# Patient Record
Sex: FEMALE | Race: WHITE | NOT HISPANIC OR LATINO
[De-identification: names, ages, dates, MRNs, and addresses within clinical notes are randomized per-mention and may not be internally consistent; named-entity substitution may affect disease eponyms.]

---

## 2020-06-03 ENCOUNTER — ASOB RESULT (OUTPATIENT)
Age: 35
End: 2020-06-03

## 2020-06-03 ENCOUNTER — APPOINTMENT (OUTPATIENT)
Dept: ANTEPARTUM | Facility: CLINIC | Age: 35
End: 2020-06-03
Payer: COMMERCIAL

## 2020-06-03 PROBLEM — Z00.00 ENCOUNTER FOR PREVENTIVE HEALTH EXAMINATION: Status: ACTIVE | Noted: 2020-06-03

## 2020-06-03 PROCEDURE — 76801 OB US < 14 WKS SINGLE FETUS: CPT

## 2020-06-03 PROCEDURE — 76813 OB US NUCHAL MEAS 1 GEST: CPT

## 2020-06-22 ENCOUNTER — TRANSCRIPTION ENCOUNTER (OUTPATIENT)
Age: 35
End: 2020-06-22

## 2020-06-30 ENCOUNTER — APPOINTMENT (OUTPATIENT)
Dept: PEDIATRIC MEDICAL GENETICS | Facility: CLINIC | Age: 35
End: 2020-06-30
Payer: COMMERCIAL

## 2020-06-30 DIAGNOSIS — Z82.0 FAMILY HISTORY OF EPILEPSY AND OTHER DISEASES OF THE NERVOUS SYSTEM: ICD-10-CM

## 2020-06-30 DIAGNOSIS — Z80.0 FAMILY HISTORY OF MALIGNANT NEOPLASM OF DIGESTIVE ORGANS: ICD-10-CM

## 2020-06-30 DIAGNOSIS — Z82.61 FAMILY HISTORY OF ARTHRITIS: ICD-10-CM

## 2020-06-30 DIAGNOSIS — Z80.42 FAMILY HISTORY OF MALIGNANT NEOPLASM OF PROSTATE: ICD-10-CM

## 2020-06-30 DIAGNOSIS — Z84.1 FAMILY HISTORY OF DISORDERS OF KIDNEY AND URETER: ICD-10-CM

## 2020-06-30 DIAGNOSIS — Z14.8 GENETIC CARRIER OF OTHER DISEASE: ICD-10-CM

## 2020-06-30 DIAGNOSIS — Z78.9 OTHER SPECIFIED HEALTH STATUS: ICD-10-CM

## 2020-06-30 DIAGNOSIS — Z83.3 FAMILY HISTORY OF DIABETES MELLITUS: ICD-10-CM

## 2020-06-30 PROCEDURE — 99204 OFFICE O/P NEW MOD 45 MIN: CPT | Mod: 95

## 2020-06-30 PROCEDURE — ZZZZZ: CPT

## 2020-06-30 NOTE — HISTORY OF PRESENT ILLNESS
[Home] : at home, [unfilled] , at the time of the visit. [Other Location: e.g. Home (Enter Location, City,State)___] : at [unfilled] [Spouse] : spouse [Verbal consent obtained from patient] : the patient, [unfilled]

## 2020-07-31 ENCOUNTER — ASOB RESULT (OUTPATIENT)
Age: 35
End: 2020-07-31

## 2020-07-31 ENCOUNTER — APPOINTMENT (OUTPATIENT)
Dept: ANTEPARTUM | Facility: CLINIC | Age: 35
End: 2020-07-31
Payer: COMMERCIAL

## 2020-07-31 PROCEDURE — 76811 OB US DETAILED SNGL FETUS: CPT

## 2020-11-18 ENCOUNTER — ASOB RESULT (OUTPATIENT)
Age: 35
End: 2020-11-18

## 2020-11-18 ENCOUNTER — APPOINTMENT (OUTPATIENT)
Dept: ANTEPARTUM | Facility: CLINIC | Age: 35
End: 2020-11-18
Payer: COMMERCIAL

## 2020-11-18 PROCEDURE — 76816 OB US FOLLOW-UP PER FETUS: CPT

## 2020-11-24 ENCOUNTER — APPOINTMENT (OUTPATIENT)
Dept: ANTEPARTUM | Facility: CLINIC | Age: 35
End: 2020-11-24
Payer: COMMERCIAL

## 2020-11-24 ENCOUNTER — ASOB RESULT (OUTPATIENT)
Age: 35
End: 2020-11-24

## 2020-11-24 PROCEDURE — 76819 FETAL BIOPHYS PROFIL W/O NST: CPT

## 2020-11-25 ENCOUNTER — APPOINTMENT (OUTPATIENT)
Dept: ANTEPARTUM | Facility: CLINIC | Age: 35
End: 2020-11-25

## 2020-11-28 ENCOUNTER — OUTPATIENT (OUTPATIENT)
Dept: OUTPATIENT SERVICES | Facility: HOSPITAL | Age: 35
LOS: 1 days | End: 2020-11-28
Payer: COMMERCIAL

## 2020-11-28 DIAGNOSIS — O26.899 OTHER SPECIFIED PREGNANCY RELATED CONDITIONS, UNSPECIFIED TRIMESTER: ICD-10-CM

## 2020-11-28 DIAGNOSIS — Z3A.00 WEEKS OF GESTATION OF PREGNANCY NOT SPECIFIED: ICD-10-CM

## 2020-11-29 VITALS
OXYGEN SATURATION: 97 % | HEART RATE: 97 BPM | TEMPERATURE: 98 F | RESPIRATION RATE: 18 BRPM | DIASTOLIC BLOOD PRESSURE: 68 MMHG | SYSTOLIC BLOOD PRESSURE: 111 MMHG

## 2020-11-29 VITALS — SYSTOLIC BLOOD PRESSURE: 108 MMHG | DIASTOLIC BLOOD PRESSURE: 61 MMHG | HEART RATE: 72 BPM

## 2020-11-29 PROCEDURE — G0463: CPT

## 2020-11-29 PROCEDURE — 59025 FETAL NON-STRESS TEST: CPT

## 2020-11-29 NOTE — OB PROVIDER TRIAGE NOTE - HISTORY OF PRESENT ILLNESS
Admission H&P    Subjective  HPI: 35yoF G1 @37/4 presents for decrease fetal movement. Noticed no FM at 7pm and has not felt FM since then. Does note cramping abdominal pain that started at 5 pm (2/10 in pain) -FM. -LOF. -CTXs. -VB. Pt denies any other concerns.    – PNC: Denies prenatal issues. GBS _.  EFW _g by sono.  – OBHx:   – GynHx: denies hx STIs, fibroids, polyps, cysts  – PMH: denies hx clotting or bleeding disroeders, HTN, DM  – PSH: denies  - PFH: no hx congential disorders, bleeding/clotting disorders  – Psych: denies   – Social: denies etoh, smoking, drugs. Safe at home/in relationship. Contraception.  – Meds: PNV   – Allergies: NKDA  – Will accept blood transfusions? Yes    Objective  – Vital Signs  BP:   HR:   Temp:   – PE:   CV: RRR  Pulm: breathing comfortably on RA  Abd: gravid, nontender  Extr: moving all extremities with ease  – FS:   – Spec: pooling, nitrazine, ferning, bleeding,  (lesions if patient with HSV2 history)  – VE: //  – FHT: baseline 1, mod variability, +accels, -decels  – [place]: qmin  – EFW: _g by sono  – Sono: vertex    Assessment  – No prenatal issues. GBS _.    Plan  1. Admit to LND. Routine Labs. IVF.  2. Expectant management/IOL w/  3. Fetus: cat 1 tracing. VTX. EFW _g by sono. Continuous EFM. Sono. No concerns.  4. Prenatal issues: none  5. GBS _  6. Pain: IV pain meds/epidural PRN    Patient discussed with attending physician,  .     Admission H&P    Subjective  HPI: 35yoF G1 @37/4 presents for decrease fetal movement. Noticed no FM at 7pm and has not felt FM since then. Does note cramping abdominal pain that started at 5 pm (2/10 in pain), mainly in RUQ. -FM. -LOF.  -VB. Pt denies any other concerns. Does note that last vaginal exam she was 1 cm dilated.     – PNC: Oligohydramnios. GBS +.    – OBHx: G1  – GynHx: denies hx STIs, fibroids, polyps, cysts  – PMH: denies hx clotting or bleeding disorders, HTN, DM  – PSH: denies  - PFH: no hx congential disorders, bleeding/clotting disorders  – Psych: denies   – Social: denies etoh, smoking, drugs  – Meds: PNV   – Allergies: PCN (rash)

## 2020-11-29 NOTE — OB PROVIDER TRIAGE NOTE - NSOBPROVIDERNOTE_OBGYN_ALL_OB_FT
Assessment  – Pt here for r/o labor and decreased FM. Pt having regular contractions with no cervical change. BPP reassuring.    Plan  1. Observation and FM for 30 minutes.  2. Home with labor precautions if NST reactive    Mariah Kebede PGY1  Patient discussed with attending physician, Dr. Donaldson.

## 2020-11-29 NOTE — OB PROVIDER TRIAGE NOTE - NSHPPHYSICALEXAM_GEN_ALL_CORE
CV: RRR  Pulm: breathing comfortably on RA  Abd: gravid, nontender  Extr: moving all extremities with ease  – VE: 1/0/-2  – FHT: baseline 120, mod variability, -decels  – [place]: q3min  – BPP= 8/8, LAUREN =31  – Sono: vertex

## 2020-11-29 NOTE — OB RN TRIAGE NOTE - NS_NPO_OBGYN_ALL_OB_DT
Patient is due for office visit in May 2020. Please assist with scheduling virtual visit, route back to triage to address refill once patient is scheduled.     Jannie Zarate RN, BSN  [facility]     28-Nov-2020 21:00

## 2020-12-02 ENCOUNTER — APPOINTMENT (OUTPATIENT)
Dept: ANTEPARTUM | Facility: CLINIC | Age: 35
End: 2020-12-02
Payer: COMMERCIAL

## 2020-12-02 ENCOUNTER — ASOB RESULT (OUTPATIENT)
Age: 35
End: 2020-12-02

## 2020-12-02 PROBLEM — Z78.9 OTHER SPECIFIED HEALTH STATUS: Chronic | Status: ACTIVE | Noted: 2020-11-29

## 2020-12-02 PROCEDURE — 99072 ADDL SUPL MATRL&STAF TM PHE: CPT

## 2020-12-02 PROCEDURE — 76819 FETAL BIOPHYS PROFIL W/O NST: CPT

## 2020-12-09 ENCOUNTER — APPOINTMENT (OUTPATIENT)
Dept: ANTEPARTUM | Facility: CLINIC | Age: 35
End: 2020-12-09
Payer: COMMERCIAL

## 2020-12-09 ENCOUNTER — ASOB RESULT (OUTPATIENT)
Age: 35
End: 2020-12-09

## 2020-12-09 PROCEDURE — 99072 ADDL SUPL MATRL&STAF TM PHE: CPT

## 2020-12-09 PROCEDURE — 76816 OB US FOLLOW-UP PER FETUS: CPT

## 2020-12-09 PROCEDURE — 76819 FETAL BIOPHYS PROFIL W/O NST: CPT

## 2020-12-11 ENCOUNTER — INPATIENT (INPATIENT)
Facility: HOSPITAL | Age: 35
LOS: 3 days | Discharge: HOME | End: 2020-12-15
Attending: OBSTETRICS & GYNECOLOGY | Admitting: OBSTETRICS & GYNECOLOGY
Payer: COMMERCIAL

## 2020-12-11 VITALS — SYSTOLIC BLOOD PRESSURE: 115 MMHG | DIASTOLIC BLOOD PRESSURE: 68 MMHG | HEART RATE: 90 BPM

## 2020-12-11 DIAGNOSIS — Z34.90 ENCOUNTER FOR SUPERVISION OF NORMAL PREGNANCY, UNSPECIFIED, UNSPECIFIED TRIMESTER: ICD-10-CM

## 2020-12-11 DIAGNOSIS — O40.3XX0 POLYHYDRAMNIOS, THIRD TRIMESTER, NOT APPLICABLE OR UNSPECIFIED: ICD-10-CM

## 2020-12-11 LAB
BASOPHILS # BLD AUTO: 0.01 K/UL — SIGNIFICANT CHANGE UP (ref 0–0.2)
BASOPHILS NFR BLD AUTO: 0.1 % — SIGNIFICANT CHANGE UP (ref 0–2)
BLD GP AB SCN SERPL QL: POSITIVE — SIGNIFICANT CHANGE UP
EOSINOPHIL # BLD AUTO: 0.01 K/UL — SIGNIFICANT CHANGE UP (ref 0–0.5)
EOSINOPHIL NFR BLD AUTO: 0.1 % — SIGNIFICANT CHANGE UP (ref 0–6)
HCT VFR BLD CALC: 37.8 % — SIGNIFICANT CHANGE UP (ref 34.5–45)
HGB BLD-MCNC: 13 G/DL — SIGNIFICANT CHANGE UP (ref 11.5–15.5)
IMM GRANULOCYTES NFR BLD AUTO: 0.4 % — SIGNIFICANT CHANGE UP (ref 0–1.5)
LYMPHOCYTES # BLD AUTO: 1.38 K/UL — SIGNIFICANT CHANGE UP (ref 1–3.3)
LYMPHOCYTES # BLD AUTO: 17.1 % — SIGNIFICANT CHANGE UP (ref 13–44)
MCHC RBC-ENTMCNC: 31.1 PG — SIGNIFICANT CHANGE UP (ref 27–34)
MCHC RBC-ENTMCNC: 34.4 GM/DL — SIGNIFICANT CHANGE UP (ref 32–36)
MCV RBC AUTO: 90.4 FL — SIGNIFICANT CHANGE UP (ref 80–100)
MONOCYTES # BLD AUTO: 0.52 K/UL — SIGNIFICANT CHANGE UP (ref 0–0.9)
MONOCYTES NFR BLD AUTO: 6.5 % — SIGNIFICANT CHANGE UP (ref 2–14)
NEUTROPHILS # BLD AUTO: 6.1 K/UL — SIGNIFICANT CHANGE UP (ref 1.8–7.4)
NEUTROPHILS NFR BLD AUTO: 75.8 % — SIGNIFICANT CHANGE UP (ref 43–77)
NRBC # BLD: 0 /100 WBCS — SIGNIFICANT CHANGE UP (ref 0–0)
PLATELET # BLD AUTO: 236 K/UL — SIGNIFICANT CHANGE UP (ref 150–400)
RBC # BLD: 4.18 M/UL — SIGNIFICANT CHANGE UP (ref 3.8–5.2)
RBC # FLD: 12.5 % — SIGNIFICANT CHANGE UP (ref 10.3–14.5)
RH IG SCN BLD-IMP: NEGATIVE — SIGNIFICANT CHANGE UP
SARS-COV-2 RNA SPEC QL NAA+PROBE: SIGNIFICANT CHANGE UP
WBC # BLD: 8.05 K/UL — SIGNIFICANT CHANGE UP (ref 3.8–10.5)
WBC # FLD AUTO: 8.05 K/UL — SIGNIFICANT CHANGE UP (ref 3.8–10.5)

## 2020-12-11 RX ORDER — OXYTOCIN 10 UNIT/ML
333.33 VIAL (ML) INJECTION
Qty: 20 | Refills: 0 | Status: DISCONTINUED | OUTPATIENT
Start: 2020-12-11 | End: 2020-12-13

## 2020-12-11 RX ORDER — SODIUM CHLORIDE 9 MG/ML
1000 INJECTION, SOLUTION INTRAVENOUS
Refills: 0 | Status: DISCONTINUED | OUTPATIENT
Start: 2020-12-11 | End: 2020-12-13

## 2020-12-11 RX ORDER — CITRIC ACID/SODIUM CITRATE 300-500 MG
15 SOLUTION, ORAL ORAL EVERY 6 HOURS
Refills: 0 | Status: DISCONTINUED | OUTPATIENT
Start: 2020-12-11 | End: 2020-12-13

## 2020-12-11 RX ORDER — VANCOMYCIN HCL 1 G
1000 VIAL (EA) INTRAVENOUS EVERY 12 HOURS
Refills: 0 | Status: DISCONTINUED | OUTPATIENT
Start: 2020-12-12 | End: 2020-12-13

## 2020-12-11 RX ORDER — VANCOMYCIN HCL 1 G
1000 VIAL (EA) INTRAVENOUS ONCE
Refills: 0 | Status: COMPLETED | OUTPATIENT
Start: 2020-12-11 | End: 2020-12-11

## 2020-12-11 RX ORDER — VANCOMYCIN HCL 1 G
VIAL (EA) INTRAVENOUS
Refills: 0 | Status: DISCONTINUED | OUTPATIENT
Start: 2020-12-11 | End: 2020-12-13

## 2020-12-11 RX ADMIN — Medication 250 MILLIGRAM(S): at 22:25

## 2020-12-11 RX ADMIN — SODIUM CHLORIDE 125 MILLILITER(S): 9 INJECTION, SOLUTION INTRAVENOUS at 21:30

## 2020-12-11 NOTE — OB PROVIDER H&P - PRO HIV INFANT
----- Message from Irene Thomason sent at 10/29/2018 12:19 PM CDT -----  Contact: Patient [phone]  Type: Orders Request    What orders/ testing are being requested?    Is there a future appointment scheduled for the patient with PCP?Yes    When?01/10/19    Would you prefer a response via "[employer]?No    Comments:Please attach orders to pt's lab appt scheduled on 01/03/19      Thanks     negative

## 2020-12-11 NOTE — OB PROVIDER H&P - HISTORY OF PRESENT ILLNESS
34yo  @ 39.2 weeks (RAM[place] ) presents for IOL 2/2 LAUREN 25. Pregancy otherwise uncomplicated.     GBS   EFW    OBHx:  GYNHx: No cysts, fibroids, hx of abnormal pap or STDs  PMHx: None  PSurgHx: None  Allergies: NKDA  Meds: PNV  Social: No smoking, alcohol, or illicit drug use  Psych: No hx of anxiety or depression 34yo  @ 39.2 weeks (RAMONA ) presents for IOL 2/2 LAUREN 25. Pregancy otherwise uncomplicated. +FM, -LOF, -VB, -CTX    GBS +  EFW 3500    OBHx: None  GYNHx: No cysts, fibroids, hx of abnormal pap or STDs  PMHx: None  PSurgHx: None  Allergies: NKDA  Meds: PCN-->rash  Social: No smoking, alcohol, or illicit drug use  Psych: No hx of anxiety or depression

## 2020-12-11 NOTE — OB PROVIDER H&P - NSHPPHYSICALEXAM_GEN_ALL_CORE
PE:  T(C): 36.8 (12-11-20 @ 21:36), Max: 36.8 (12-11-20 @ 21:36)  HR: 90 (12-11-20 @ 21:36) (90 - 90)  BP: 115/68 (12-11-20 @ 21:36) (115/68 - 115/68)  RR: 18 (12-11-20 @ 21:36) (18 - 18)  SpO2: 100% (12-11-20 @ 21:36) (100% - 100%)  General: NAD, A&Ox3  CV: RRR  Lungs: Clear bilat   Abd: soft, nontender, gravid  VE: 1/60/-3  EFM: 120/moderate variablity/+accels/-decels  TOCO: irregular  BSUS: vertex

## 2020-12-11 NOTE — OB PROVIDER H&P - ASSESSMENT
A/P: 34yo  @ 39.2 weeks for IOL 2/2 poly  - Admit to L&D  - Routine labs   - COVID swab for PCR  - EFM/TOCO  - Clear liquid diet  - IVH  - Anesthesia consult-->epiPRN  - PO cytotec for IOL  - Vanc for GBS ppx   - Bicitra  - Expect     dw Dr Anderson who is in house   Laura Guevara PAC

## 2020-12-12 LAB
SARS-COV-2 IGG SERPL QL IA: NEGATIVE — SIGNIFICANT CHANGE UP
SARS-COV-2 IGM SERPL IA-ACNC: 0.07 INDEX — SIGNIFICANT CHANGE UP
T PALLIDUM AB TITR SER: NEGATIVE — SIGNIFICANT CHANGE UP

## 2020-12-12 PROCEDURE — 86077 PHYS BLOOD BANK SERV XMATCH: CPT

## 2020-12-12 RX ORDER — ONDANSETRON 8 MG/1
4 TABLET, FILM COATED ORAL ONCE
Refills: 0 | Status: DISCONTINUED | OUTPATIENT
Start: 2020-12-12 | End: 2020-12-13

## 2020-12-12 RX ADMIN — Medication 250 MILLIGRAM(S): at 22:52

## 2020-12-12 RX ADMIN — Medication 250 MILLIGRAM(S): at 10:49

## 2020-12-12 NOTE — CHART NOTE - NSCHARTNOTEFT_GEN_A_CORE
R2 Labor Note    S: Patient evaluated at bedside for cervical change; pt considering epidural. Also per RN, patient had slow leakage of clear fluid since 530pm.     O:  T(C): 37.0 (20 @ 17:02), Max: 37.0 (20 @ 15:37)  HR: 71 (20 @ 19:52) (55 - 121)  BP: 113/70 (20 @ 18:41) (101/56 - 123/61)  RR: 16 (20 @ 17:02) (16 - 18)  SpO2: 94% (20 @ 19:52) (86% - 100%)    SVE: /-3 (SROM confirmed, ample clear fluid noted on exam)  EFM: 130bpm, mod isamar, +accel, -decel  [place]:  cx q3min      A/P 35y P0 @39.3wks IOL for Poly (LAUREN 25)  -IOL: c/w PO cytotec  -Cat 1 tracing  -GBS pos, on vanc  -EFW 3500g  -COVID neg  -Analgesia - pt would like to wait on epidural until she is more dilated.   -Anticipate     c/w plan as per Dr. Oh Robyn Frankel PGY-2

## 2020-12-13 ENCOUNTER — TRANSCRIPTION ENCOUNTER (OUTPATIENT)
Age: 35
End: 2020-12-13

## 2020-12-13 PROCEDURE — 88307 TISSUE EXAM BY PATHOLOGIST: CPT | Mod: 26

## 2020-12-13 RX ORDER — OXYTOCIN 10 UNIT/ML
333.33 VIAL (ML) INJECTION
Qty: 20 | Refills: 0 | Status: DISCONTINUED | OUTPATIENT
Start: 2020-12-13 | End: 2020-12-15

## 2020-12-13 RX ORDER — TETANUS TOXOID, REDUCED DIPHTHERIA TOXOID AND ACELLULAR PERTUSSIS VACCINE, ADSORBED 5; 2.5; 8; 8; 2.5 [IU]/.5ML; [IU]/.5ML; UG/.5ML; UG/.5ML; UG/.5ML
0.5 SUSPENSION INTRAMUSCULAR ONCE
Refills: 0 | Status: DISCONTINUED | OUTPATIENT
Start: 2020-12-13 | End: 2020-12-15

## 2020-12-13 RX ORDER — DIBUCAINE 1 %
1 OINTMENT (GRAM) RECTAL EVERY 6 HOURS
Refills: 0 | Status: DISCONTINUED | OUTPATIENT
Start: 2020-12-13 | End: 2020-12-15

## 2020-12-13 RX ORDER — BENZOCAINE 10 %
1 GEL (GRAM) MUCOUS MEMBRANE EVERY 6 HOURS
Refills: 0 | Status: DISCONTINUED | OUTPATIENT
Start: 2020-12-13 | End: 2020-12-15

## 2020-12-13 RX ORDER — HYDROCORTISONE 1 %
1 OINTMENT (GRAM) TOPICAL EVERY 6 HOURS
Refills: 0 | Status: DISCONTINUED | OUTPATIENT
Start: 2020-12-13 | End: 2020-12-15

## 2020-12-13 RX ORDER — IBUPROFEN 200 MG
600 TABLET ORAL EVERY 6 HOURS
Refills: 0 | Status: DISCONTINUED | OUTPATIENT
Start: 2020-12-13 | End: 2020-12-15

## 2020-12-13 RX ORDER — KETOROLAC TROMETHAMINE 30 MG/ML
30 SYRINGE (ML) INJECTION ONCE
Refills: 0 | Status: DISCONTINUED | OUTPATIENT
Start: 2020-12-13 | End: 2020-12-13

## 2020-12-13 RX ORDER — IBUPROFEN 200 MG
600 TABLET ORAL EVERY 6 HOURS
Refills: 0 | Status: COMPLETED | OUTPATIENT
Start: 2020-12-13 | End: 2021-11-11

## 2020-12-13 RX ORDER — PRAMOXINE HYDROCHLORIDE 150 MG/15G
1 AEROSOL, FOAM RECTAL EVERY 4 HOURS
Refills: 0 | Status: DISCONTINUED | OUTPATIENT
Start: 2020-12-13 | End: 2020-12-15

## 2020-12-13 RX ORDER — ACETAMINOPHEN 500 MG
975 TABLET ORAL
Refills: 0 | Status: DISCONTINUED | OUTPATIENT
Start: 2020-12-13 | End: 2020-12-15

## 2020-12-13 RX ORDER — OXYCODONE HYDROCHLORIDE 5 MG/1
5 TABLET ORAL
Refills: 0 | Status: DISCONTINUED | OUTPATIENT
Start: 2020-12-13 | End: 2020-12-15

## 2020-12-13 RX ORDER — SIMETHICONE 80 MG/1
80 TABLET, CHEWABLE ORAL EVERY 4 HOURS
Refills: 0 | Status: DISCONTINUED | OUTPATIENT
Start: 2020-12-13 | End: 2020-12-15

## 2020-12-13 RX ORDER — SIMETHICONE 80 MG/1
1 TABLET, CHEWABLE ORAL
Qty: 0 | Refills: 0 | DISCHARGE
Start: 2020-12-13

## 2020-12-13 RX ORDER — OXYTOCIN 10 UNIT/ML
2 VIAL (ML) INJECTION
Qty: 30 | Refills: 0 | Status: DISCONTINUED | OUTPATIENT
Start: 2020-12-13 | End: 2020-12-13

## 2020-12-13 RX ORDER — SODIUM CHLORIDE 9 MG/ML
3 INJECTION INTRAMUSCULAR; INTRAVENOUS; SUBCUTANEOUS EVERY 8 HOURS
Refills: 0 | Status: DISCONTINUED | OUTPATIENT
Start: 2020-12-13 | End: 2020-12-15

## 2020-12-13 RX ORDER — ACETAMINOPHEN 500 MG
3 TABLET ORAL
Qty: 0 | Refills: 0 | DISCHARGE
Start: 2020-12-13

## 2020-12-13 RX ORDER — LANOLIN
1 OINTMENT (GRAM) TOPICAL
Qty: 0 | Refills: 0 | DISCHARGE
Start: 2020-12-13

## 2020-12-13 RX ORDER — AER TRAVELER 0.5 G/1
1 SOLUTION RECTAL; TOPICAL EVERY 4 HOURS
Refills: 0 | Status: DISCONTINUED | OUTPATIENT
Start: 2020-12-13 | End: 2020-12-15

## 2020-12-13 RX ORDER — DIPHENHYDRAMINE HCL 50 MG
25 CAPSULE ORAL EVERY 6 HOURS
Refills: 0 | Status: DISCONTINUED | OUTPATIENT
Start: 2020-12-13 | End: 2020-12-15

## 2020-12-13 RX ORDER — MAGNESIUM HYDROXIDE 400 MG/1
30 TABLET, CHEWABLE ORAL
Refills: 0 | Status: DISCONTINUED | OUTPATIENT
Start: 2020-12-13 | End: 2020-12-15

## 2020-12-13 RX ORDER — AER TRAVELER 0.5 G/1
1 SOLUTION RECTAL; TOPICAL
Qty: 0 | Refills: 0 | DISCHARGE
Start: 2020-12-13

## 2020-12-13 RX ORDER — OXYCODONE HYDROCHLORIDE 5 MG/1
5 TABLET ORAL ONCE
Refills: 0 | Status: DISCONTINUED | OUTPATIENT
Start: 2020-12-13 | End: 2020-12-15

## 2020-12-13 RX ORDER — LANOLIN
1 OINTMENT (GRAM) TOPICAL EVERY 6 HOURS
Refills: 0 | Status: DISCONTINUED | OUTPATIENT
Start: 2020-12-13 | End: 2020-12-15

## 2020-12-13 RX ORDER — IBUPROFEN 200 MG
1 TABLET ORAL
Qty: 0 | Refills: 0 | DISCHARGE
Start: 2020-12-13

## 2020-12-13 RX ADMIN — Medication 975 MILLIGRAM(S): at 20:51

## 2020-12-13 RX ADMIN — Medication 600 MILLIGRAM(S): at 17:19

## 2020-12-13 RX ADMIN — Medication 1 TABLET(S): at 13:37

## 2020-12-13 RX ADMIN — Medication 975 MILLIGRAM(S): at 15:00

## 2020-12-13 RX ADMIN — Medication 600 MILLIGRAM(S): at 18:24

## 2020-12-13 RX ADMIN — Medication 30 MILLIGRAM(S): at 06:55

## 2020-12-13 RX ADMIN — Medication 975 MILLIGRAM(S): at 21:50

## 2020-12-13 RX ADMIN — Medication 975 MILLIGRAM(S): at 14:03

## 2020-12-13 RX ADMIN — Medication 2 MILLIUNIT(S)/MIN: at 04:56

## 2020-12-13 RX ADMIN — Medication 30 MILLIGRAM(S): at 06:15

## 2020-12-13 NOTE — PROVIDER CONTACT NOTE (OTHER) - ACTION/TREATMENT ORDERED:
Pt to pump to soften tissue, pull nipple out from flat position, pt educated on signs and symptoms of mastitis

## 2020-12-13 NOTE — OB RN DELIVERY SUMMARY - NS_SEPSISRSKCALC_OBGYN_ALL_OB_FT
EOS calculated successfully. EOS Risk Factor: 0.5/1000 live births ([facility] national incidence); GA=39w4d; Temp=99.32; ROM=11.95; GBS='Positive'; Antibiotics='Broad spectrum antibiotics > 4 hrs prior to birth'

## 2020-12-13 NOTE — DISCHARGE NOTE OB - CARE PLAN
Principal Discharge DX:	Vacuum extractor delivery, delivered  Goal:	Return to baseline function  Assessment and plan of treatment:	regular diet, activity as tolerated, follow up as out patient

## 2020-12-13 NOTE — PROVIDER CONTACT NOTE (OTHER) - ASSESSMENT
Patient presents with very engorged, red blotchy breasts, flat nipples, hard areolas, not able to hand express even a drip of colostrum

## 2020-12-13 NOTE — OB PROVIDER DELIVERY SUMMARY - NSPROVIDERDELIVERYNOTE_OBGYN_ALL_OB_FT
Due to cat II tracing with pushing, decision made to proceed with vacuum assisted delivery. Pt moved to OR for vacuum delivery. Fetal vertex at +2 station, OA position. MityVac applied. Head delivered with 2 pulls, 0 pop-offs. Shoulders and body delivered easily. Infant was suctioned. No mec. 1 minute delayed cord clamping was performed. Cord clamped and cut and infant passed to peds, present for VAVD.  Placenta delivered intact with a 3 vessel cord. Fundal massage was given and uterine fundus was found to be firm. Vaginal exam revealed an intact cervix and vaginal walls. Pt had a 2nd degree laceration and left sulcal tear repaired with 2.0 chromic vicryl suture. Excellent hemostasis was noted. Patient was stable and went to recovery. Count was correct x2. Due to cat II tracing with pushing, decision made to proceed with vacuum assisted delivery. Pt moved to OR for vacuum delivery. Fetal vertex at +2 station, OA position. MityVac applied. Head delivered with 2 pulls, 0 pop-offs. Shoulders and body delivered easily. Infant was suctioned. No mec. 1 minute delayed cord clamping was performed. Cord clamped and cut and infant passed to peds, present for VAVD.  Placenta delivered intact with a 3 vessel cord. Fundal massage was given and uterine fundus was found to be firm. Vaginal exam revealed an intact cervix and vaginal walls. Pt had a 2nd degree laceration and left sulcal tear repaired with 2.0 vicryl suture. Excellent hemostasis was noted. Patient was stable and went to recovery. Count was correct x2. Due to cat II tracing with pushing, decision made to proceed with vacuum assisted delivery. Pt moved to OR for vacuum delivery. Fetal vertex at +2 station, OA position. MityVac applied. Head delivered with 2 pulls, 0 pop-offs. Shoulders and body delivered easily. Infant was suctioned. No mec. 1 minute delayed cord clamping was performed. Cord clamped and cut and infant passed to peds, present for VAVD.  Placenta delivered intact with a 3 vessel cord. Fundal massage was given and uterine fundus was found to be firm. Vaginal exam revealed an intact cervix and vaginal walls. Pt had a 2nd degree laceration and left sulcal tear repaired with 2.0 chromic suture. Excellent hemostasis was noted. Patient was stable and went to recovery. Count was correct x2.

## 2020-12-13 NOTE — CHART NOTE - NSCHARTNOTEFT_GEN_A_CORE
R1 Progress Note    Patient seen and examined at bedside for complaints of rectal pressure.    NAD  Vital Signs Last 24 Hrs  T(C): 36.8 (12 Dec 2020 22:21), Max: 37.0 (12 Dec 2020 15:37)  T(F): 98.24 (12 Dec 2020 22:21), Max: 98.6 (12 Dec 2020 15:37)  HR: 86 (13 Dec 2020 01:50) (55 - 121)  BP: 107/65 (13 Dec 2020 01:36) (98/56 - 130/66)  RR: 16 (12 Dec 2020 22:21) (16 - 16)  SpO2: 93% (13 Dec 2020 01:50) (86% - 99%)    SVE: 7/80/0  EFM: 125/mod/+accel/-decel  TOCO: q3-5min    A/P 36yo  IOL 2/2 AFI25, progressing well.     - labor: expectant management  - fetus: cat 1 FHT  - gbs: pos on Vanc  - pain: epidural in place, no complaints of pain    d/w Dr. Celso Meyers MD PGY1

## 2020-12-13 NOTE — DISCHARGE NOTE OB - MEDICATION SUMMARY - MEDICATIONS TO TAKE
I will START or STAY ON the medications listed below when I get home from the hospital:    acetaminophen 325 mg oral tablet  -- 3 tab(s) by mouth   -- Indication: For as needed for pain    ibuprofen 600 mg oral tablet  -- 1 tab(s) by mouth every 6 hours  -- Indication: For as needed for pain    lanolin topical ointment  -- 1 application on skin every 6 hours, As needed, nipple soreness  -- Indication: For as needed for nipple soreness    witch hazel 50% topical pad  -- 1 application on skin every 4 hours, As needed, Perineal discomfort  -- Indication: For Perineal pain    Prenatal Multivitamins with Folic Acid 1 mg oral tablet  -- 1 tab(s) by mouth once a day  -- Indication: For Post partum    simethicone 80 mg oral tablet, chewable  -- 1 tab(s) by mouth every 4 hours, As needed, Gas  -- Indication: For as needed for gas pain

## 2020-12-13 NOTE — OB RN DELIVERY SUMMARY - NS_LABORCHARACTER_OBGYN_ALL_OB
Internal electronic FM/Antibiotics in labor/Induction of labor-Medicinal/External electronic FM/Fetal intolerance

## 2020-12-13 NOTE — OB NEONATOLOGY/PEDIATRICIAN DELIVERY SUMMARY - NSPEDSNEONOTESA_OBGYN_ALL_OB_FT
Karrie Meyers is a 39.2 wk M born to a 34 y/o A-  via VAVD.  COVID-19 neg. Maternal history unremarkable. Pregnancy notable for polyhydramnios. PNL neg/NR/immune. GBS pos on . Antibiotics were (vancomycin given for PCN allergy) given. SROM at 1730 hours with clear fluid. Neonatology called for NRFHT.. Baby born vigorous and crying. Baby was warm, dried, stimulated, and suctioned.  APGARS 9 and 9. EOS 0.17 (Maternal T 37.4C). Will be . Guardian consents to circumcision. Guardian consents to Hep B.

## 2020-12-13 NOTE — DISCHARGE NOTE OB - HOSPITAL COURSE
Pt underwent an VAVD for category 2 tracing without any complications. Her postpartum course was uneventful. She met all her milestsones in regards to her vitals, postpartum labs, diet, ambulation, pain management. Follow up discussed. Pt was discharged home on PPD#2.

## 2020-12-13 NOTE — DISCHARGE NOTE OB - PATIENT PORTAL LINK FT
You can access the FollowMyHealth Patient Portal offered by [facility] by registering at the following website: [email or web address]. By joining [employer]’s FollowMyHealth portal, you will also be able to view your health information using other applications (apps) compatible with our system.

## 2020-12-13 NOTE — DISCHARGE NOTE OB - HOME CARE AGENCY TYPE OF SERVICE:
visiting nurse - virtual/telehealth visit.   They will call to set up exact time of visit on 12/16/2020

## 2020-12-13 NOTE — DISCHARGE NOTE OB - CARE PROVIDER_API CALL
Roopa Hosuton  OBSTETRICS AND GYNECOLOGY  [address] [place], [place] [id number]  Phone: [phone]  Fax: [phone]  Follow Up Time:

## 2020-12-14 DIAGNOSIS — Z37.9 OUTCOME OF DELIVERY, UNSPECIFIED: ICD-10-CM

## 2020-12-14 RX ADMIN — Medication 600 MILLIGRAM(S): at 17:40

## 2020-12-14 RX ADMIN — Medication 975 MILLIGRAM(S): at 20:34

## 2020-12-14 RX ADMIN — Medication 975 MILLIGRAM(S): at 15:40

## 2020-12-14 RX ADMIN — Medication 600 MILLIGRAM(S): at 11:39

## 2020-12-14 RX ADMIN — Medication 975 MILLIGRAM(S): at 04:10

## 2020-12-14 RX ADMIN — Medication 600 MILLIGRAM(S): at 17:10

## 2020-12-14 RX ADMIN — Medication 600 MILLIGRAM(S): at 12:00

## 2020-12-14 RX ADMIN — Medication 1 TABLET(S): at 11:39

## 2020-12-14 RX ADMIN — Medication 975 MILLIGRAM(S): at 03:14

## 2020-12-14 RX ADMIN — Medication 975 MILLIGRAM(S): at 21:30

## 2020-12-14 RX ADMIN — Medication 975 MILLIGRAM(S): at 09:00

## 2020-12-14 RX ADMIN — Medication 600 MILLIGRAM(S): at 01:12

## 2020-12-14 RX ADMIN — Medication 600 MILLIGRAM(S): at 00:19

## 2020-12-14 RX ADMIN — Medication 975 MILLIGRAM(S): at 15:07

## 2020-12-14 RX ADMIN — Medication 600 MILLIGRAM(S): at 07:00

## 2020-12-14 RX ADMIN — Medication 975 MILLIGRAM(S): at 08:31

## 2020-12-14 RX ADMIN — Medication 600 MILLIGRAM(S): at 23:51

## 2020-12-14 RX ADMIN — Medication 600 MILLIGRAM(S): at 06:14

## 2020-12-14 NOTE — PROGRESS NOTE ADULT - PROBLEM SELECTOR PLAN 1
- Continue with po analgesia  - Increase ambulation  - Continue regular diet  - IV lock  - No labs    Page Correa   PGY-1

## 2020-12-14 NOTE — PROGRESS NOTE ADULT - ASSESSMENT
35 y.o.  PPD#2 from [facility] with 2nd def laceration and left sulcal tear, . No significant PMHx. No current issues.

## 2020-12-14 NOTE — PROGRESS NOTE ADULT - SUBJECTIVE AND OBJECTIVE BOX
R1 Progress Note     35 y.o. PPD#1 from [facility]. Patient seen and examined at bedside, no acute overnight events. No acute complaints, pain well controlled. Patient is ambulating, voiding spontaneously, passing gas, and tolerating regular diet. Denies CP, SOB, N/V, HA, blurred vision, epigastric pain, fevers, chills.    Vital Signs Last 24 Hours  T(C): 36.5 (12-13-20 @ 21:04), Max: 37.4 (12-13-20 @ 03:38)  HR: 87 (12-13-20 @ 21:04) (67 - 130)  BP: 108/68 (12-13-20 @ 21:04) (91/52 - 143/65)  RR: 18 (12-13-20 @ 21:04) (16 - 18)  SpO2: 97% (12-13-20 @ 21:04) (83% - 97%)    Physical Exam:  General: NAD  Abdomen: Soft, non-tender, non-distended, fundus firm  Pelvic: Lochia wnl    Labs:    Blood Type: A Negative  Antibody Screen: --  RPR: Negative               13.0   8.05  )-----------( 236      ( 12-11 @ 22:31 )             37.8         MEDICATIONS  (STANDING):  acetaminophen   Tablet .. 975 milliGRAM(s) Oral <User Schedule>  diphtheria/tetanus/pertussis (acellular) Vaccine (ADAcel) 0.5 milliLiter(s) IntraMuscular once  ibuprofen  Tablet. 600 milliGRAM(s) Oral every 6 hours  oxytocin Infusion 333.333 milliUNIT(s)/Min (1000 mL/Hr) IV Continuous <Continuous>  prenatal multivitamin 1 Tablet(s) Oral daily  sodium chloride 0.9% lock flush 3 milliLiter(s) IV Push every 8 hours    MEDICATIONS  (PRN):  benzocaine 20%/menthol 0.5% Spray 1 Spray(s) Topical every 6 hours PRN for Perineal discomfort  dibucaine 1% Ointment 1 Application(s) Topical every 6 hours PRN Perineal discomfort  diphenhydrAMINE 25 milliGRAM(s) Oral every 6 hours PRN Pruritus  hydrocortisone 1% Cream 1 Application(s) Topical every 6 hours PRN Moderate Pain (4-6)  lanolin Ointment 1 Application(s) Topical every 6 hours PRN nipple soreness  magnesium hydroxide Suspension 30 milliLiter(s) Oral two times a day PRN Constipation  oxyCODONE    IR 5 milliGRAM(s) Oral every 3 hours PRN Moderate to Severe Pain (4-10)  oxyCODONE    IR 5 milliGRAM(s) Oral once PRN Moderate to Severe Pain (4-10)  pramoxine 1%/zinc 5% Cream 1 Application(s) Topical every 4 hours PRN Moderate Pain (4-6)  simethicone 80 milliGRAM(s) Chew every 4 hours PRN Gas  witch hazel Pads 1 Application(s) Topical every 4 hours PRN Perineal discomfort

## 2020-12-15 VITALS
SYSTOLIC BLOOD PRESSURE: 114 MMHG | RESPIRATION RATE: 18 BRPM | HEART RATE: 82 BPM | TEMPERATURE: 99 F | DIASTOLIC BLOOD PRESSURE: 76 MMHG | OXYGEN SATURATION: 97 %

## 2020-12-15 PROCEDURE — 86769 SARS-COV-2 COVID-19 ANTIBODY: CPT

## 2020-12-15 PROCEDURE — 85025 COMPLETE CBC W/AUTO DIFF WBC: CPT

## 2020-12-15 PROCEDURE — 86901 BLOOD TYPING SEROLOGIC RH(D): CPT

## 2020-12-15 PROCEDURE — 88307 TISSUE EXAM BY PATHOLOGIST: CPT

## 2020-12-15 PROCEDURE — 86850 RBC ANTIBODY SCREEN: CPT

## 2020-12-15 PROCEDURE — 87635 SARS-COV-2 COVID-19 AMP PRB: CPT

## 2020-12-15 PROCEDURE — 59050 FETAL MONITOR W/REPORT: CPT

## 2020-12-15 PROCEDURE — 86780 TREPONEMA PALLIDUM: CPT

## 2020-12-15 PROCEDURE — 86900 BLOOD TYPING SEROLOGIC ABO: CPT

## 2020-12-15 PROCEDURE — 86870 RBC ANTIBODY IDENTIFICATION: CPT

## 2020-12-15 PROCEDURE — 59025 FETAL NON-STRESS TEST: CPT

## 2020-12-15 RX ADMIN — Medication 975 MILLIGRAM(S): at 09:20

## 2020-12-15 RX ADMIN — Medication 600 MILLIGRAM(S): at 12:40

## 2020-12-15 RX ADMIN — Medication 1 TABLET(S): at 12:06

## 2020-12-15 RX ADMIN — Medication 600 MILLIGRAM(S): at 12:06

## 2020-12-15 RX ADMIN — Medication 975 MILLIGRAM(S): at 08:48

## 2020-12-15 RX ADMIN — Medication 600 MILLIGRAM(S): at 00:41

## 2020-12-15 NOTE — PROGRESS NOTE ADULT - PROBLEM SELECTOR PLAN 1
- Continue with po analgesia  - Increase ambulation  - Continue regular diet  - IV lock  - No labs  - pending deirdre Correa   PGY-1

## 2020-12-15 NOTE — PROGRESS NOTE ADULT - SUBJECTIVE AND OBJECTIVE BOX
R1 Progress Note     35 y.o. PPD#2 from [facility]. Patient seen and examined at bedside, no acute overnight events. No acute complaints, pain well controlled. Patient is ambulating, voiding spontaneously, passing gas, and tolerating regular diet. Denies CP, SOB, N/V, HA, blurred vision, epigastric pain, fevers, chills.    Vital Signs Last 24 Hours  T(C): 36.3 (12-15-20 @ 00:15), Max: 36.9 (12-14-20 @ 17:11)  HR: 75 (12-15-20 @ 00:15) (75 - 84)  BP: 107/68 (12-15-20 @ 00:15) (92/60 - 110/67)  RR: 18 (12-15-20 @ 00:15) (18 - 18)  SpO2: 96% (12-15-20 @ 00:15) (96% - 97%)    Physical Exam:  General: NAD  Abdomen: Soft, non-tender, non-distended, fundus firm  Pelvic: Lochia wnl    Labs:    Blood Type: A Negative  Antibody Screen: --  RPR: Negative               13.0   8.05  )-----------( 236      ( 12-11 @ 22:31 )             37.8         MEDICATIONS  (STANDING):  acetaminophen   Tablet .. 975 milliGRAM(s) Oral <User Schedule>  diphtheria/tetanus/pertussis (acellular) Vaccine (ADAcel) 0.5 milliLiter(s) IntraMuscular once  ibuprofen  Tablet. 600 milliGRAM(s) Oral every 6 hours  oxytocin Infusion 333.333 milliUNIT(s)/Min (1000 mL/Hr) IV Continuous <Continuous>  prenatal multivitamin 1 Tablet(s) Oral daily  sodium chloride 0.9% lock flush 3 milliLiter(s) IV Push every 8 hours    MEDICATIONS  (PRN):  benzocaine 20%/menthol 0.5% Spray 1 Spray(s) Topical every 6 hours PRN for Perineal discomfort  dibucaine 1% Ointment 1 Application(s) Topical every 6 hours PRN Perineal discomfort  diphenhydrAMINE 25 milliGRAM(s) Oral every 6 hours PRN Pruritus  hydrocortisone 1% Cream 1 Application(s) Topical every 6 hours PRN Moderate Pain (4-6)  lanolin Ointment 1 Application(s) Topical every 6 hours PRN nipple soreness  magnesium hydroxide Suspension 30 milliLiter(s) Oral two times a day PRN Constipation  oxyCODONE    IR 5 milliGRAM(s) Oral every 3 hours PRN Moderate to Severe Pain (4-10)  oxyCODONE    IR 5 milliGRAM(s) Oral once PRN Moderate to Severe Pain (4-10)  pramoxine 1%/zinc 5% Cream 1 Application(s) Topical every 4 hours PRN Moderate Pain (4-6)  simethicone 80 milliGRAM(s) Chew every 4 hours PRN Gas  witch hazel Pads 1 Application(s) Topical every 4 hours PRN Perineal discomfort

## 2020-12-15 NOTE — PROGRESS NOTE ADULT - ASSESSMENT
34y/o  PPD#1 from [facility],   with 2nd deg laceration and left sulcal tear in stable condition. No significant PMHx. No current issues.

## 2020-12-15 NOTE — PROGRESS NOTE ADULT - ATTENDING COMMENTS
Patient seen and examined bedside, agree with above note. Doing well, stable for discharge. Routine PP care for home reviewed, follow up in office in 5 weeks.

## 2020-12-18 ENCOUNTER — NON-APPOINTMENT (OUTPATIENT)
Age: 35
End: 2020-12-18

## 2021-01-22 LAB — SURGICAL PATHOLOGY STUDY: SIGNIFICANT CHANGE UP

## 2021-10-02 NOTE — OB NEONATOLOGY/PEDIATRICIAN DELIVERY SUMMARY - NSCONDLEAVINGLDA_OBGYN_ALL_OB
Good You were seen for methadone dose of 35 mg.    Please return the ER tomorrow for your Sunday dose of 35mg.

## 2024-09-26 NOTE — OB RN DELIVERY SUMMARY - BABY A: APGAR 1 MIN COLOR, DELIVERY
(L) 80.0 - 99.0 FL    MCH 19.9 (L) 26.0 - 34.0 PG    MCHC 29.8 (L) 30.0 - 36.5 g/dL    RDW 17.1 (H) 11.5 - 14.5 %    Platelets 418 (H) 150 - 400 K/uL    MPV 10.4 8.9 - 12.9 FL    Nucleated RBCs 0.0 0.0  WBC    nRBC 0.00 0.00 - 0.01 K/uL    Neutrophils % 48 32 - 75 %    Lymphocytes % 42 12 - 49 %    Monocytes % 8 5 - 13 %    Eosinophils % 1 0 - 7 %    Basophils % 1 0 - 1 %    Immature Granulocytes % 0 0 - 0.5 %    Neutrophils Absolute 2.6 1.8 - 8.0 K/UL    Lymphocytes Absolute 2.4 0.8 - 3.5 K/UL    Monocytes Absolute 0.5 0.0 - 1.0 K/UL    Eosinophils Absolute 0.1 0.0 - 0.4 K/UL    Basophils Absolute 0.1 0.0 - 0.1 K/UL    Immature Granulocytes Absolute 0.0 0.00 - 0.04 K/UL    Differential Type Smear Scanned      RBC Comment Microcytosis  2+        RBC Comment Anisocytosis  1+        RBC Comment Hypochromia  2+       Comprehensive Metabolic Panel    Collection Time: 09/26/24  6:33 PM   Result Value Ref Range    Sodium 138 136 - 145 mmol/L    Potassium Hemolyzed, Recollection Recommended 3.5 - 5.1 mmol/L    Chloride 104 97 - 108 mmol/L    CO2 27 21 - 32 mmol/L    Anion Gap 7 2 - 12 mmol/L    Glucose 53 (LL) 65 - 100 mg/dL    BUN 13 6 - 20 mg/dL    Creatinine 0.76 0.55 - 1.02 mg/dL    BUN/Creatinine Ratio 17 12 - 20      Est, Glom Filt Rate >90 >60 ml/min/1.73m2    Calcium 9.5 8.5 - 10.1 mg/dL    Total Bilirubin 0.7 0.2 - 1.0 mg/dL    AST Hemolyzed, Recollection Recommended 15 - 37 U/L    ALT 28 12 - 78 U/L    Alk Phosphatase 29 (L) 45 - 117 U/L    Total Protein 7.5 6.4 - 8.2 g/dL    Albumin 4.2 3.5 - 5.0 g/dL    Globulin 3.3 2.0 - 4.0 g/dL    Albumin/Globulin Ratio 1.3 1.1 - 2.2     Lipase    Collection Time: 09/26/24  6:33 PM   Result Value Ref Range    Lipase 47 13 - 75 U/L       EKG: Not Applicable    Radiologic Studies:  Non-plain film images such as CT, Ultrasound and MRI are read by the radiologist. Plain radiographic images are visualized and preliminarily interpreted by the ED Physician with the following 
(1) body pink, extremities blue
